# Patient Record
Sex: MALE | Race: BLACK OR AFRICAN AMERICAN | NOT HISPANIC OR LATINO | Employment: FULL TIME | URBAN - METROPOLITAN AREA
[De-identification: names, ages, dates, MRNs, and addresses within clinical notes are randomized per-mention and may not be internally consistent; named-entity substitution may affect disease eponyms.]

---

## 2021-08-25 ENCOUNTER — HOSPITAL ENCOUNTER (EMERGENCY)
Facility: HOSPITAL | Age: 56
Discharge: HOME | End: 2021-08-25
Attending: EMERGENCY MEDICINE | Admitting: EMERGENCY MEDICINE
Payer: COMMERCIAL

## 2021-08-25 VITALS
TEMPERATURE: 97.8 F | WEIGHT: 315 LBS | DIASTOLIC BLOOD PRESSURE: 95 MMHG | OXYGEN SATURATION: 99 % | HEART RATE: 75 BPM | RESPIRATION RATE: 18 BRPM | SYSTOLIC BLOOD PRESSURE: 183 MMHG

## 2021-08-25 DIAGNOSIS — H10.32 ACUTE CONJUNCTIVITIS OF LEFT EYE, UNSPECIFIED ACUTE CONJUNCTIVITIS TYPE: ICD-10-CM

## 2021-08-25 PROBLEM — M16.12 PRIMARY OSTEOARTHRITIS OF LEFT HIP: Status: ACTIVE | Noted: 2017-08-03

## 2021-08-25 PROBLEM — Z96.642 STATUS POST TOTAL HIP REPLACEMENT, LEFT: Status: ACTIVE | Noted: 2017-09-07

## 2021-08-25 PROBLEM — E78.5 DYSLIPIDEMIA: Status: ACTIVE | Noted: 2021-07-01

## 2021-08-25 PROBLEM — R06.81 WITNESSED APNEIC SPELLS: Status: ACTIVE | Noted: 2021-07-01

## 2021-08-25 PROCEDURE — 250N000009 HC RX 250: Performed by: EMERGENCY MEDICINE

## 2021-08-25 PROCEDURE — 99283 EMERGENCY DEPT VISIT LOW MDM: CPT

## 2021-08-25 RX ORDER — ERYTHROMYCIN 5 MG/G
0.5 OINTMENT OPHTHALMIC 3 TIMES DAILY
Qty: 3.5 G | Refills: 0 | Status: SHIPPED | OUTPATIENT
Start: 2021-08-25

## 2021-08-25 RX ORDER — TETRACAINE HYDROCHLORIDE 5 MG/ML
1-2 SOLUTION OPHTHALMIC ONCE
Status: COMPLETED | OUTPATIENT
Start: 2021-08-25 | End: 2021-08-25

## 2021-08-25 RX ADMIN — TETRACAINE HYDROCHLORIDE 2 DROP: 5 SOLUTION OPHTHALMIC at 16:32

## 2021-08-25 RX ADMIN — FLUORESCEIN SODIUM 600 MCG: 0.6 STRIP OPHTHALMIC at 16:33

## 2021-08-25 ASSESSMENT — VISUAL ACUITY
OS: 20/15
OD: 20/10

## 2021-08-25 NOTE — ED PROVIDER NOTES
EMERGENCY DEPARTMENT ENCOUNTER      NAME: Nicho Cuadra  AGE: 56 year old male  YOB: 1965  MRN: [id number]  EVALUATION DATE & TIME: 8/25/2021  4:07 PM    PCP: No primary care provider on file.    ED PROVIDER: Sanjana Interiano MD    Chief Complaint   Patient presents with     Eye Problem         FINAL IMPRESSION:  1. Acute conjunctivitis of left eye, unspecified acute conjunctivitis type          ED COURSE & MEDICAL DECISION MAKING:    Pertinent Labs & Imaging studies reviewed. (See chart for details)  56 year old male with history of CAD, HLD, DM who presents to the Emergency Department for evaluation of foreign body sensation in his left eye x1 week.  On exam his visual acuity is intact.  He does not have any visualized foreign body, nor evidence of fluorescein uptake/corneal abrasion.  Does have conjunctival injection but no significant photophobia to suspect an iritis.  Interestingly however at 5:00 on the periphery of his cornea he is about a 2 x 2 millimeter area that somewhat raised.  This does not have a central clearing for corneal ulcer.  Would be an atypical finding for healing abrasion.  Case discussed with [facility] who agrees with covering him with antibiotics and having him follow-up in clinic for repeat eye exam.  We will discharged home on erythromycin ointment.  Patient and spouse comfortable with plan.    4:13 PM I met with the patient and performed my initial exam.  I discussed the plan for care and the patient is agreeable with this plan. PPE: Provider wore gloves, N95, eye protection, and paper mask.  4:21 PM I performed a slit lamp procedure.  4:39 PM I spoke with Dr. Saucedo from [facility].  4:56 PM  I discussed the plan for discharge with the patient, and patient is agreeable. We discussed supportivecares at home and reasons for return to the ER including new or worsening symptoms - all questions and concerns addressed. Patient to be discharged by RN.           At the  conclusion of the encounter I discussed the results of all of the tests and the disposition. The questions were answered. The patient or family acknowledged understanding and was agreeable with the care plan.    CONSULTS:  Ophthalmology    MEDICATIONS GIVEN IN THE EMERGENCY:  Medications   tetracaine (PONTOCAINE) 0.5 % ophthalmic solution 1-2 drop (2 drops Both Eyes Given 8/25/21 1632)   fluorescein (FUL-IVONE) ophthalmic strip STRP 600 mcg (600 mcg Both Eyes Given 8/25/21 1633)       NEW PRESCRIPTIONS STARTED AT TODAY'S ER VISIT  New Prescriptions    ERYTHROMYCIN (ROMYCIN) 5 MG/GM OPHTHALMIC OINTMENT    Place 0.5 inches Into the left eye 3 times daily          =================================================================    HPI    Patient information was obtained from: the patient    Use of Intrepreter: N/A     Nicho Cuadra is a 56 year old male with pertinent medical history of type II diabetes mellitus, hypertension who presents eye problem.    The patient reports he has felt like he has something caught in his left eye since ~8/18/21.  He has tried to flush and irrigate his eye without resolve.  The patient also notes mild photophobia and tearing pain when he opens his eyes.      The patient denies visual changes and any other symptoms or complaints at this time.    The patient does not use glasses or contacts.       REVIEW OF SYSTEMS  Constitutional:  Denies fever, chills, weight loss or weakness  Eyes:  No discharge, redness. Positive for sensation of foreign body in left eye, photophobia, and pain with opening eyes. Negative for visual changes.   Neurologic:  Denies headache, focal weakness or sensory changes  All other systems negative unless noted in HPI.      PAST MEDICAL HISTORY:  Past Medical History:   Diagnosis Date     Diabetes (H)      HLD (hyperlipidemia)      Hypertension      Obesity        PAST SURGICAL HISTORY:  History reviewed. No pertinent surgical history.    CURRENT MEDICATIONS:    None        ALLERGIES:  No Known Allergies    FAMILY HISTORY:  History reviewed. No pertinent family history.    SOCIAL HISTORY:  Social History     Tobacco Use     Smoking status: None   Substance Use Topics     Alcohol use: None     Drug use: None        VITALS:  Patient Vitals for the past 24 hrs:   BP Temp Temp src Pulse Resp SpO2 Weight   08/25/21 1451 (!) 191/83 (!) 96.4  F (35.8  C) Tympanic 73 18 95 % 142.9 kg (315 lb)       PHYSICAL EXAM    General Appearance: Well-appearing, well-nourished, no acute distress.  Head:  Normocephalic  Eyes:  Left eye conjunctiva is erythematous and has slight edematous.  Mild Photophobia.  No fluorescein uptake.  Negative Gemma sign.  With the slit lamp: no hyphema, no cell or flair in anterior chamber.  No hypopyon.  Circular raised area at ~5 o' clock on the periphery of the cornea.  It is ~2 mm x 2 mm.  This does not have a central divot to suspect corneal ulcer  ENT:   membranes are moist without pallor  Neck:  Supple  Cardio:  Regular rate and rhythm  Pulm:  No respiratory distress  Abdomen: Obese  Extremities: Moves all extremities normally, normal gait  Skin:  Skin warm, dry, no rashes  Neuro:  Alert and oriented ×3, moving all extremities, no gross sensory defects     Visual Assessment  Visual Acuity-Right 20/10   Visual Acuity-Left 20/15   Vision - Corrective Lenses None             The creation of this record is based on the scribe s observations of the work being performed by Sanjana Interiano MD and the provider s statements to them. It was created on his behalf by Wayne Linda, a trained medical scribe. This document has been checked and approved by the attending provider.    Sanjana Interiano MD  Emergency Medicine  [facility] EMERGENCY DEPARTMENT  [facility] [facility] [id number] [phone]  Dept: [phone]     Sanjana Interiano MD  08/25/21 2990

## 2021-08-25 NOTE — DISCHARGE INSTRUCTIONS
Suspect you may have had a corneal abrasion that is healing.  Clearly there is a component of conjunctivitis.  Take antibiotic eye ointment as prescribed.  Follow-up with [facility] tomorrow by phone to schedule outpatient follow-up appointment.

## 2021-08-25 NOTE — ED TRIAGE NOTES
"Patient arrives by private car for evaluation of left eye pain x 2 weeks.  Patient states that \"it feels like there is something in my eye\"  Reports he has been flushing it at home without relief.  Denies trauma.    "